# Patient Record
Sex: FEMALE | URBAN - METROPOLITAN AREA
[De-identification: names, ages, dates, MRNs, and addresses within clinical notes are randomized per-mention and may not be internally consistent; named-entity substitution may affect disease eponyms.]

---

## 2023-05-10 ENCOUNTER — HOME HEALTH ADMISSION (OUTPATIENT)
Dept: HOME HEALTH SERVICES | Facility: HOME HEALTH | Age: 78
End: 2023-05-10
Payer: MEDICARE

## 2023-05-11 ENCOUNTER — HOME CARE VISIT (OUTPATIENT)
Dept: SCHEDULING | Facility: HOME HEALTH | Age: 78
End: 2023-05-11

## 2023-05-11 PROCEDURE — 0221000100 HH NO PAY CLAIM PROCEDURE

## 2023-05-11 PROCEDURE — G0299 HHS/HOSPICE OF RN EA 15 MIN: HCPCS

## 2023-05-12 ENCOUNTER — HOME CARE VISIT (OUTPATIENT)
Dept: HOME HEALTH SERVICES | Facility: HOME HEALTH | Age: 78
End: 2023-05-12

## 2023-05-12 PROCEDURE — G0151 HHCP-SERV OF PT,EA 15 MIN: HCPCS

## 2023-05-15 ENCOUNTER — HOME CARE VISIT (OUTPATIENT)
Dept: HOME HEALTH SERVICES | Facility: HOME HEALTH | Age: 78
End: 2023-05-15
Payer: MEDICARE

## 2023-05-15 VITALS
TEMPERATURE: 97.2 F | SYSTOLIC BLOOD PRESSURE: 120 MMHG | RESPIRATION RATE: 62 BRPM | OXYGEN SATURATION: 97 % | HEART RATE: 62 BPM | DIASTOLIC BLOOD PRESSURE: 60 MMHG

## 2023-05-15 PROCEDURE — G0152 HHCP-SERV OF OT,EA 15 MIN: HCPCS

## 2023-05-15 ASSESSMENT — ENCOUNTER SYMPTOMS
PAIN LOCATION - PAIN QUALITY: ACHY
COUGH CHARACTERISTICS: NON-PRODUCTIVE
DYSPNEA ACTIVITY LEVEL: AFTER AMBULATING 10 - 20 FT
STOOL DESCRIPTION: FORMED
COUGH: 1

## 2023-05-15 NOTE — HOME HEALTH
Pt seen today for admission to home care following hospitalization for ARF/CKD along with Depression and HTN. SHe ia Ax[employer], able to make her needs known and is corperative with writter for visit. Skin W/D/I. She has noticable SOB after meeting writer at door she has reported that this is not new for her and is resolving she has a new inhaler that was rx from recent hospital dc. Writer has done teaching on how to use and to rinse mouth out after each use and importance of keeping equipment clean. Lungs were CTA and her O2 sat was 97%. She has a CPAP machine that she uses at night and has had this for many yrs she is able to manage all cares. HRRRR, BS present X 4. No reported problems with diarrhea or constipation. She appears well nourished and well hyrdarated. Reports that her appetite is improving. She has recently lost her  and has relocated [place] to be near her sister. She has been dealing with servere Depression and insomnia. while she has in hospital she speak with psych that prescibed her Welbutrin however there was no script sent homwe following dc. She was also sent home with a vial of Lantus and no syringes, writer has made multiple phone calls to hospital,medical records her [employer] and the [facility] MD no return phone calls as of yet. SHe does have insulin pens which she has been usig since DC. She has reported that she will be using these. BS range 150-288. SHe hs reported that the rehab facilltiy stop one of her oral hypoglycemic meds she could not recall the name. Will wait and adress this issue when MD office returns writers call. She is ambulating with a walker at this time and this is new for her prior to this hospitalization she was only using a cane. writer did teaching on nrwmoving throw rugs and keeping pathways clear to prevent falls or injuries. Verbalized understanding. She has generalized body aches 2/10 uses Tylenol PRN and rest reported to be effective. Plan PT/OT eval. SNV 1W1.2W1 1W7.

## 2023-05-17 ENCOUNTER — HOME CARE VISIT (OUTPATIENT)
Dept: HOME HEALTH SERVICES | Facility: HOME HEALTH | Age: 78
End: 2023-05-17

## 2023-05-17 ENCOUNTER — HOME CARE VISIT (OUTPATIENT)
Dept: SCHEDULING | Facility: HOME HEALTH | Age: 78
End: 2023-05-17
Payer: MEDICARE

## 2023-05-17 VITALS
TEMPERATURE: 97.7 F | RESPIRATION RATE: 18 BRPM | HEART RATE: 62 BPM | DIASTOLIC BLOOD PRESSURE: 60 MMHG | SYSTOLIC BLOOD PRESSURE: 118 MMHG

## 2023-05-17 PROCEDURE — G0299 HHS/HOSPICE OF RN EA 15 MIN: HCPCS

## 2023-05-17 ASSESSMENT — ENCOUNTER SYMPTOMS
DYSPNEA ACTIVITY LEVEL: AFTER AMBULATING 10 - 20 FT
STOOL DESCRIPTION: FORMED

## 2023-05-18 ENCOUNTER — HOME CARE VISIT (OUTPATIENT)
Dept: HOME HEALTH SERVICES | Facility: HOME HEALTH | Age: 78
End: 2023-05-18
Payer: MEDICARE

## 2023-05-18 VITALS
HEART RATE: 76 BPM | OXYGEN SATURATION: 98 % | DIASTOLIC BLOOD PRESSURE: 81 MMHG | SYSTOLIC BLOOD PRESSURE: 130 MMHG | TEMPERATURE: 97.2 F

## 2023-05-18 PROCEDURE — G0151 HHCP-SERV OF PT,EA 15 MIN: HCPCS

## 2023-05-19 ENCOUNTER — HOME CARE VISIT (OUTPATIENT)
Dept: SCHEDULING | Facility: HOME HEALTH | Age: 78
End: 2023-05-19
Payer: MEDICARE

## 2023-05-19 ENCOUNTER — HOME CARE VISIT (OUTPATIENT)
Dept: HOME HEALTH SERVICES | Facility: HOME HEALTH | Age: 78
End: 2023-05-19
Payer: MEDICARE

## 2023-05-19 PROCEDURE — G0299 HHS/HOSPICE OF RN EA 15 MIN: HCPCS

## 2023-05-19 PROCEDURE — G0157 HHC PT ASSISTANT EA 15: HCPCS

## 2023-05-20 VITALS
DIASTOLIC BLOOD PRESSURE: 60 MMHG | SYSTOLIC BLOOD PRESSURE: 122 MMHG | RESPIRATION RATE: 18 BRPM | OXYGEN SATURATION: 98 % | TEMPERATURE: 98 F | WEIGHT: 229 LBS | HEART RATE: 67 BPM

## 2023-05-20 ASSESSMENT — ENCOUNTER SYMPTOMS
COUGH CHARACTERISTICS: NON-PRODUCTIVE
PAIN LOCATION - PAIN QUALITY: THROBBING
DYSPNEA ACTIVITY LEVEL: AFTER AMBULATING 10 - 20 FT
COUGH: 1
STOOL DESCRIPTION: FORMED

## 2023-05-20 NOTE — HOME HEALTH
Problem: DM, CKD, Depression, Long term insulin and anticoagulation use. Reported insommnia. Intervention: Pt has had multiple MD appts recently, medications changes have been made. She has been started back on Bupropian by psych and she has reported that she feels like this is helping with her depression. Teaching on importance of using this QD and to not take later in the day as it can cause sleeplessness. She has verbalized she takes in am. Trazadone was started for insommnia and she has stated this is helping. She hsa started back on her Victoza 1.8 SQ QD she feels her BS have been more controlled it was 123 this am. her A1C has also dropped from 8 down to 6.8. She has increase BLE Edema today and is trying to put Teds on, writer attempted and these are to small to safely apply. She is going to order sz up. Teaching on elevation and increase fluids. Understanding voiced. Goal: Pt will be free from falls/injury/infection/pain. DM will be managed without complication. DC : When nuring sersvices are no longer required and pt is able to manage cares safely.

## 2023-05-22 ENCOUNTER — HOME CARE VISIT (OUTPATIENT)
Dept: HOME HEALTH SERVICES | Facility: HOME HEALTH | Age: 78
End: 2023-05-22
Payer: MEDICARE

## 2023-05-22 VITALS
DIASTOLIC BLOOD PRESSURE: 80 MMHG | HEART RATE: 94 BPM | SYSTOLIC BLOOD PRESSURE: 124 MMHG | SYSTOLIC BLOOD PRESSURE: 111 MMHG | HEART RATE: 89 BPM | OXYGEN SATURATION: 97 % | DIASTOLIC BLOOD PRESSURE: 63 MMHG

## 2023-05-22 VITALS
OXYGEN SATURATION: 97 % | TEMPERATURE: 97.7 F | DIASTOLIC BLOOD PRESSURE: 60 MMHG | RESPIRATION RATE: 18 BRPM | HEART RATE: 77 BPM | SYSTOLIC BLOOD PRESSURE: 130 MMHG

## 2023-05-22 PROCEDURE — G0157 HHC PT ASSISTANT EA 15: HCPCS

## 2023-05-22 ASSESSMENT — ENCOUNTER SYMPTOMS
DYSPNEA ACTIVITY LEVEL: AFTER AMBULATING 10 - 20 FT
STOOL DESCRIPTION: FORMED

## 2023-05-22 NOTE — HOME HEALTH
Problem: DM, CKD, Depression, Long term insulin and anticoagulation use. Intervention: Pt has had multiple MD appts recently, medications changes have been made. She has been started back on Bupropian by psych and she has reported that she feels like this is helping with her depression. She has increase BLE Edema today . Teaching on elevation and increase fluids. Understanding voiced. Goal: Pt will be free from falls/injury/infection/pain. DM will be managed without complication. DC : When nuring sersvices are no longer required and pt is able to manage cares safely.
Clothing

## 2023-05-23 ENCOUNTER — HOME CARE VISIT (OUTPATIENT)
Dept: HOME HEALTH SERVICES | Facility: HOME HEALTH | Age: 78
End: 2023-05-23
Payer: MEDICARE

## 2023-05-23 PROCEDURE — G0152 HHCP-SERV OF OT,EA 15 MIN: HCPCS

## 2023-05-25 ENCOUNTER — HOME CARE VISIT (OUTPATIENT)
Dept: HOME HEALTH SERVICES | Facility: HOME HEALTH | Age: 78
End: 2023-05-25
Payer: MEDICARE

## 2023-05-25 PROCEDURE — G0152 HHCP-SERV OF OT,EA 15 MIN: HCPCS

## 2023-05-25 PROCEDURE — G0151 HHCP-SERV OF PT,EA 15 MIN: HCPCS

## 2023-05-26 ENCOUNTER — HOME CARE VISIT (OUTPATIENT)
Dept: SCHEDULING | Facility: HOME HEALTH | Age: 78
End: 2023-05-26
Payer: MEDICARE

## 2023-05-26 PROCEDURE — G0299 HHS/HOSPICE OF RN EA 15 MIN: HCPCS

## 2023-05-31 ENCOUNTER — HOME CARE VISIT (OUTPATIENT)
Dept: SCHEDULING | Facility: HOME HEALTH | Age: 78
End: 2023-05-31
Payer: MEDICARE

## 2023-05-31 ENCOUNTER — HOME CARE VISIT (OUTPATIENT)
Dept: HOME HEALTH SERVICES | Facility: HOME HEALTH | Age: 78
End: 2023-05-31
Payer: MEDICARE

## 2023-05-31 VITALS
TEMPERATURE: 98.2 F | RESPIRATION RATE: 18 BRPM | SYSTOLIC BLOOD PRESSURE: 128 MMHG | HEART RATE: 85 BPM | DIASTOLIC BLOOD PRESSURE: 80 MMHG | OXYGEN SATURATION: 95 %

## 2023-05-31 PROCEDURE — G0299 HHS/HOSPICE OF RN EA 15 MIN: HCPCS

## 2023-05-31 PROCEDURE — G0157 HHC PT ASSISTANT EA 15: HCPCS

## 2023-05-31 PROCEDURE — G0152 HHCP-SERV OF OT,EA 15 MIN: HCPCS

## 2023-05-31 ASSESSMENT — ENCOUNTER SYMPTOMS
DYSPNEA ACTIVITY LEVEL: AFTER AMBULATING 10 - 20 FT
STOOL DESCRIPTION: FORMED

## 2023-05-31 NOTE — HOME HEALTH
Problem: Depression, DM RA HTN    Intervention: Pt reported N/V and Diarrhea last pm. Stated she did not sleep well. She has reported she thinks it was something she ate at dinner. She has stated this subsided approx 3hrs ago and has been able to keep down a few crackes and some Ginger Ale. BS was 118. She has taken her medications this am. VSS. she is afebrile. Teaching on incresaing fluids and the BRAT diet performed with teach back method. Lungs CTA. HRRR. Abd soft and non tender. BS X 4. BLE edema 1+pitting. Educatd on elevating during the day and night to decrease edema. Goals: Pt will remain free from falls, innuries or infection. DM will be managed without complications. Depression will improve.

## 2023-06-01 ENCOUNTER — HOME CARE VISIT (OUTPATIENT)
Dept: HOME HEALTH SERVICES | Facility: HOME HEALTH | Age: 78
End: 2023-06-01
Payer: MEDICARE

## 2023-06-01 VITALS
SYSTOLIC BLOOD PRESSURE: 138 MMHG | OXYGEN SATURATION: 94 % | SYSTOLIC BLOOD PRESSURE: 128 MMHG | TEMPERATURE: 97.2 F | HEART RATE: 103 BPM | HEART RATE: 85 BPM | OXYGEN SATURATION: 99 % | DIASTOLIC BLOOD PRESSURE: 80 MMHG | HEART RATE: 93 BPM | SYSTOLIC BLOOD PRESSURE: 136 MMHG | DIASTOLIC BLOOD PRESSURE: 72 MMHG | DIASTOLIC BLOOD PRESSURE: 55 MMHG | HEART RATE: 100 BPM | SYSTOLIC BLOOD PRESSURE: 115 MMHG | OXYGEN SATURATION: 95 % | DIASTOLIC BLOOD PRESSURE: 59 MMHG | OXYGEN SATURATION: 95 %

## 2023-06-01 PROCEDURE — G0152 HHCP-SERV OF OT,EA 15 MIN: HCPCS

## 2023-06-02 ENCOUNTER — HOME CARE VISIT (OUTPATIENT)
Dept: HOME HEALTH SERVICES | Facility: HOME HEALTH | Age: 78
End: 2023-06-02
Payer: MEDICARE

## 2023-06-02 PROCEDURE — G0157 HHC PT ASSISTANT EA 15: HCPCS

## 2023-06-03 VITALS
SYSTOLIC BLOOD PRESSURE: 130 MMHG | DIASTOLIC BLOOD PRESSURE: 70 MMHG | RESPIRATION RATE: 18 BRPM | TEMPERATURE: 98.2 F | HEART RATE: 77 BPM | OXYGEN SATURATION: 98 %

## 2023-06-03 ASSESSMENT — ENCOUNTER SYMPTOMS
DYSPNEA ACTIVITY LEVEL: AFTER AMBULATING 10 - 20 FT
STOOL DESCRIPTION: FORMED

## 2023-06-03 NOTE — HOME HEALTH
Problem: DM, CKD, Depression, Long term insulin and anticoagulation use. Intervention: Pt has had multiple MD appts recently, medications changes have been made. She has been started back on Bupropian by psych and she has reported that she feels like this is helping with her depression. She has increase BLE Edema today . Teaching on elevation and increase fluids. Understanding voiced. Plan to order her large teds as her current ones are to small. Goal: Pt will be free from falls/injury/infection/pain. DM will be managed without complication. Plan DC planning this week.

## 2023-06-09 VITALS
DIASTOLIC BLOOD PRESSURE: 72 MMHG | HEART RATE: 111 BPM | HEART RATE: 88 BPM | HEART RATE: 80 BPM | OXYGEN SATURATION: 97 % | SYSTOLIC BLOOD PRESSURE: 119 MMHG | OXYGEN SATURATION: 99 % | DIASTOLIC BLOOD PRESSURE: 74 MMHG | HEART RATE: 95 BPM | OXYGEN SATURATION: 98 % | OXYGEN SATURATION: 97 % | SYSTOLIC BLOOD PRESSURE: 134 MMHG

## 2023-06-15 ENCOUNTER — HOME CARE VISIT (OUTPATIENT)
Dept: HOME HEALTH SERVICES | Facility: HOME HEALTH | Age: 78
End: 2023-06-15
Payer: MEDICARE

## 2023-06-15 PROCEDURE — G0299 HHS/HOSPICE OF RN EA 15 MIN: HCPCS

## 2023-06-19 VITALS
OXYGEN SATURATION: 98 % | DIASTOLIC BLOOD PRESSURE: 86 MMHG | HEART RATE: 62 BPM | TEMPERATURE: 98.1 F | RESPIRATION RATE: 18 BRPM | SYSTOLIC BLOOD PRESSURE: 130 MMHG

## 2023-06-19 ASSESSMENT — ENCOUNTER SYMPTOMS
DYSPNEA ACTIVITY LEVEL: AFTER AMBULATING 10 - 20 FT
STOOL DESCRIPTION: FORMED